# Patient Record
Sex: MALE | Employment: OTHER | URBAN - NONMETROPOLITAN AREA
[De-identification: names, ages, dates, MRNs, and addresses within clinical notes are randomized per-mention and may not be internally consistent; named-entity substitution may affect disease eponyms.]

---

## 2018-10-18 LAB
ALBUMIN SERPL-MCNC: 4 G/DL
ALP BLD-CCNC: 61 U/L
ALT SERPL-CCNC: 35 U/L
ANION GAP SERPL CALCULATED.3IONS-SCNC: 12 MMOL/L
AST SERPL-CCNC: 28 U/L
BILIRUB SERPL-MCNC: 0.6 MG/DL (ref 0.1–1.4)
BUN BLDV-MCNC: 15 MG/DL
CALCIUM SERPL-MCNC: 9.2 MG/DL
CHLORIDE BLD-SCNC: 101 MMOL/L
CHOLESTEROL, TOTAL: 149 MG/DL
CHOLESTEROL/HDL RATIO: 2.3
CO2: 33 MMOL/L
CREAT SERPL-MCNC: 0.87 MG/DL
GFR CALCULATED: NORMAL
GLUCOSE BLD-MCNC: 110 MG/DL
HDLC SERPL-MCNC: 64 MG/DL (ref 35–70)
LDL CHOLESTEROL CALCULATED: 66 MG/DL (ref 0–160)
POTASSIUM SERPL-SCNC: 4.9 MMOL/L
PROSTATE SPECIFIC ANTIGEN: 0.32 NG/ML
SODIUM BLD-SCNC: 141 MMOL/L
TESTOSTERONE TOTAL: 454
TOTAL PROTEIN: 6.7
TRIGL SERPL-MCNC: 95 MG/DL
VLDLC SERPL CALC-MCNC: 19 MG/DL

## 2018-12-03 ENCOUNTER — OFFICE VISIT (OUTPATIENT)
Dept: UROLOGY | Age: 58
End: 2018-12-03
Payer: COMMERCIAL

## 2018-12-03 VITALS
WEIGHT: 201.5 LBS | SYSTOLIC BLOOD PRESSURE: 112 MMHG | DIASTOLIC BLOOD PRESSURE: 80 MMHG | HEIGHT: 70 IN | BODY MASS INDEX: 28.85 KG/M2

## 2018-12-03 DIAGNOSIS — R68.82 LOW LIBIDO: Primary | ICD-10-CM

## 2018-12-03 LAB
BILIRUBIN URINE: NEGATIVE
BLOOD URINE, POC: NEGATIVE
CHARACTER, URINE: CLEAR
COLOR, URINE: YELLOW
GLUCOSE URINE: NEGATIVE MG/DL
KETONES, URINE: NEGATIVE
LEUKOCYTE CLUMPS, URINE: NEGATIVE
NITRITE, URINE: NEGATIVE
PH, URINE: 5.5
PROTEIN, URINE: NEGATIVE MG/DL
SPECIFIC GRAVITY, URINE: 1.02 (ref 1–1.03)
UROBILINOGEN, URINE: 0.2 EU/DL

## 2018-12-03 PROCEDURE — 99203 OFFICE O/P NEW LOW 30 MIN: CPT | Performed by: NURSE PRACTITIONER

## 2018-12-03 PROCEDURE — 81003 URINALYSIS AUTO W/O SCOPE: CPT | Performed by: NURSE PRACTITIONER

## 2018-12-03 RX ORDER — SILDENAFIL CITRATE 20 MG/1
20-100 TABLET ORAL PRN
Qty: 30 TABLET | Refills: 3 | Status: SHIPPED | OUTPATIENT
Start: 2018-12-03 | End: 2020-08-27

## 2018-12-03 RX ORDER — FINASTERIDE 5 MG/1
5 TABLET, FILM COATED ORAL DAILY
COMMUNITY
End: 2018-12-03 | Stop reason: CLARIF

## 2018-12-03 RX ORDER — ROSUVASTATIN CALCIUM 10 MG/1
10 TABLET, COATED ORAL DAILY
COMMUNITY

## 2018-12-03 RX ORDER — FINASTERIDE 1 MG/1
1 TABLET, FILM COATED ORAL DAILY
COMMUNITY

## 2020-06-03 ENCOUNTER — NURSE ONLY (OUTPATIENT)
Dept: LAB | Age: 60
End: 2020-06-03

## 2020-08-27 ENCOUNTER — HOSPITAL ENCOUNTER (INPATIENT)
Age: 60
LOS: 2 days | Discharge: HOME | DRG: 179 | End: 2020-08-30
Attending: EMERGENCY MEDICINE | Admitting: INTERNAL MEDICINE
Payer: COMMERCIAL

## 2020-08-27 ENCOUNTER — APPOINTMENT (OUTPATIENT)
Dept: GENERAL RADIOLOGY | Age: 60
DRG: 179 | End: 2020-08-27
Payer: COMMERCIAL

## 2020-08-27 LAB
ALBUMIN SERPL-MCNC: 3.6 G/DL (ref 3.5–5.1)
ALP BLD-CCNC: 53 U/L (ref 38–126)
ALT SERPL-CCNC: 17 U/L (ref 11–66)
ANION GAP SERPL CALCULATED.3IONS-SCNC: 9 MEQ/L (ref 8–16)
AST SERPL-CCNC: 20 U/L (ref 5–40)
BILIRUB SERPL-MCNC: 0.2 MG/DL (ref 0.3–1.2)
BUN BLDV-MCNC: 18 MG/DL (ref 7–22)
C-REACTIVE PROTEIN: 0.37 MG/DL (ref 0–1)
CALCIUM SERPL-MCNC: 8.4 MG/DL (ref 8.5–10.5)
CHLORIDE BLD-SCNC: 96 MEQ/L (ref 98–111)
CO2: 24 MEQ/L (ref 23–33)
CREAT SERPL-MCNC: 1 MG/DL (ref 0.4–1.2)
GFR SERPL CREATININE-BSD FRML MDRD: 76 ML/MIN/1.73M2
GLUCOSE BLD-MCNC: 118 MG/DL (ref 70–108)
LACTIC ACID: 0.8 MMOL/L (ref 0.5–2.2)
OSMOLALITY CALCULATION: 261.9 MOSMOL/KG (ref 275–300)
POTASSIUM SERPL-SCNC: 4.5 MEQ/L (ref 3.5–5.2)
PRO-BNP: 41.5 PG/ML (ref 0–900)
PROCALCITONIN: 0.05 NG/ML (ref 0.01–0.09)
SODIUM BLD-SCNC: 129 MEQ/L (ref 135–145)
TOTAL PROTEIN: 6 G/DL (ref 6.1–8)
TROPONIN T: < 0.01 NG/ML

## 2020-08-27 PROCEDURE — 83605 ASSAY OF LACTIC ACID: CPT

## 2020-08-27 PROCEDURE — 99285 EMERGENCY DEPT VISIT HI MDM: CPT

## 2020-08-27 PROCEDURE — 71045 X-RAY EXAM CHEST 1 VIEW: CPT

## 2020-08-27 PROCEDURE — U0003 INFECTIOUS AGENT DETECTION BY NUCLEIC ACID (DNA OR RNA); SEVERE ACUTE RESPIRATORY SYNDROME CORONAVIRUS 2 (SARS-COV-2) (CORONAVIRUS DISEASE [COVID-19]), AMPLIFIED PROBE TECHNIQUE, MAKING USE OF HIGH THROUGHPUT TECHNOLOGIES AS DESCRIBED BY CMS-2020-01-R: HCPCS

## 2020-08-27 PROCEDURE — 84145 PROCALCITONIN (PCT): CPT

## 2020-08-27 PROCEDURE — 87040 BLOOD CULTURE FOR BACTERIA: CPT

## 2020-08-27 PROCEDURE — 83880 ASSAY OF NATRIURETIC PEPTIDE: CPT

## 2020-08-27 PROCEDURE — 86140 C-REACTIVE PROTEIN: CPT

## 2020-08-27 PROCEDURE — 36415 COLL VENOUS BLD VENIPUNCTURE: CPT

## 2020-08-27 PROCEDURE — 6370000000 HC RX 637 (ALT 250 FOR IP): Performed by: EMERGENCY MEDICINE

## 2020-08-27 PROCEDURE — 84484 ASSAY OF TROPONIN QUANT: CPT

## 2020-08-27 PROCEDURE — 85025 COMPLETE CBC W/AUTO DIFF WBC: CPT

## 2020-08-27 PROCEDURE — 80053 COMPREHEN METABOLIC PANEL: CPT

## 2020-08-27 PROCEDURE — 82728 ASSAY OF FERRITIN: CPT

## 2020-08-27 RX ORDER — ACETAMINOPHEN 325 MG/1
650 TABLET ORAL ONCE
Status: COMPLETED | OUTPATIENT
Start: 2020-08-27 | End: 2020-08-27

## 2020-08-27 RX ADMIN — ACETAMINOPHEN 650 MG: 325 TABLET ORAL at 22:13

## 2020-08-27 ASSESSMENT — PAIN SCALES - GENERAL: PAINLEVEL_OUTOF10: 0

## 2020-08-28 PROBLEM — U07.1 COVID-19: Status: ACTIVE | Noted: 2020-08-28

## 2020-08-28 LAB
ATYPICAL LYMPHOCYTES: ABNORMAL %
BASOPHILS # BLD: 0.3 %
BASOPHILS ABSOLUTE: 0 THOU/MM3 (ref 0–0.1)
EOSINOPHIL # BLD: 0.8 %
EOSINOPHILS ABSOLUTE: 0 THOU/MM3 (ref 0–0.4)
ERYTHROCYTE [DISTWIDTH] IN BLOOD BY AUTOMATED COUNT: 12.4 % (ref 11.5–14.5)
ERYTHROCYTE [DISTWIDTH] IN BLOOD BY AUTOMATED COUNT: 41.5 FL (ref 35–45)
FERRITIN: 115 NG/ML (ref 22–322)
HCT VFR BLD CALC: 42.7 % (ref 42–52)
HEMOGLOBIN: 13.6 GM/DL (ref 14–18)
IMMATURE GRANS (ABS): 0.01 THOU/MM3 (ref 0–0.07)
IMMATURE GRANULOCYTES: 0.3 %
LYMPHOCYTES # BLD: 25.2 %
LYMPHOCYTES ABSOLUTE: 1 THOU/MM3 (ref 1–4.8)
MCH RBC QN AUTO: 29.2 PG (ref 26–33)
MCHC RBC AUTO-ENTMCNC: 31.9 GM/DL (ref 32.2–35.5)
MCV RBC AUTO: 91.8 FL (ref 80–94)
MONOCYTES # BLD: 15.4 %
MONOCYTES ABSOLUTE: 0.6 THOU/MM3 (ref 0.4–1.3)
NUCLEATED RED BLOOD CELLS: 0 /100 WBC
PLATELET # BLD: 185 THOU/MM3 (ref 130–400)
PLATELET ESTIMATE: ADEQUATE
PMV BLD AUTO: 9.2 FL (ref 9.4–12.4)
RBC # BLD: 4.65 MILL/MM3 (ref 4.7–6.1)
SARS-COV-2, NAAT: DETECTED
SCAN OF BLOOD SMEAR: NORMAL
SEG NEUTROPHILS: 58 %
SEGMENTED NEUTROPHILS ABSOLUTE COUNT: 2.2 THOU/MM3 (ref 1.8–7.7)
WBC # BLD: 3.8 THOU/MM3 (ref 4.8–10.8)

## 2020-08-28 PROCEDURE — 2580000003 HC RX 258: Performed by: PHYSICIAN ASSISTANT

## 2020-08-28 PROCEDURE — 6370000000 HC RX 637 (ALT 250 FOR IP): Performed by: PHYSICIAN ASSISTANT

## 2020-08-28 PROCEDURE — 2500000003 HC RX 250 WO HCPCS: Performed by: INTERNAL MEDICINE

## 2020-08-28 PROCEDURE — 6360000002 HC RX W HCPCS: Performed by: PHYSICIAN ASSISTANT

## 2020-08-28 PROCEDURE — 99223 1ST HOSP IP/OBS HIGH 75: CPT | Performed by: PHYSICIAN ASSISTANT

## 2020-08-28 PROCEDURE — U0002 COVID-19 LAB TEST NON-CDC: HCPCS

## 2020-08-28 PROCEDURE — 99221 1ST HOSP IP/OBS SF/LOW 40: CPT | Performed by: INTERNAL MEDICINE

## 2020-08-28 PROCEDURE — 2580000003 HC RX 258: Performed by: INTERNAL MEDICINE

## 2020-08-28 PROCEDURE — 94760 N-INVAS EAR/PLS OXIMETRY 1: CPT

## 2020-08-28 PROCEDURE — 1200000003 HC TELEMETRY R&B

## 2020-08-28 RX ORDER — PROMETHAZINE HYDROCHLORIDE 25 MG/1
12.5 TABLET ORAL EVERY 6 HOURS PRN
Status: DISCONTINUED | OUTPATIENT
Start: 2020-08-28 | End: 2020-08-30 | Stop reason: HOSPADM

## 2020-08-28 RX ORDER — SODIUM CHLORIDE 0.9 % (FLUSH) 0.9 %
10 SYRINGE (ML) INJECTION EVERY 12 HOURS SCHEDULED
Status: DISCONTINUED | OUTPATIENT
Start: 2020-08-28 | End: 2020-08-30 | Stop reason: HOSPADM

## 2020-08-28 RX ORDER — SODIUM CHLORIDE 0.9 % (FLUSH) 0.9 %
10 SYRINGE (ML) INJECTION PRN
Status: DISCONTINUED | OUTPATIENT
Start: 2020-08-28 | End: 2020-08-30 | Stop reason: HOSPADM

## 2020-08-28 RX ORDER — POLYETHYLENE GLYCOL 3350 17 G/17G
17 POWDER, FOR SOLUTION ORAL DAILY PRN
Status: DISCONTINUED | OUTPATIENT
Start: 2020-08-28 | End: 2020-08-30 | Stop reason: HOSPADM

## 2020-08-28 RX ORDER — ONDANSETRON 2 MG/ML
4 INJECTION INTRAMUSCULAR; INTRAVENOUS EVERY 6 HOURS PRN
Status: DISCONTINUED | OUTPATIENT
Start: 2020-08-28 | End: 2020-08-30 | Stop reason: HOSPADM

## 2020-08-28 RX ORDER — ACETAMINOPHEN 325 MG/1
650 TABLET ORAL EVERY 6 HOURS PRN
Status: DISCONTINUED | OUTPATIENT
Start: 2020-08-28 | End: 2020-08-30 | Stop reason: HOSPADM

## 2020-08-28 RX ORDER — ACETAMINOPHEN 650 MG/1
650 SUPPOSITORY RECTAL EVERY 6 HOURS PRN
Status: DISCONTINUED | OUTPATIENT
Start: 2020-08-28 | End: 2020-08-30 | Stop reason: HOSPADM

## 2020-08-28 RX ORDER — FINASTERIDE 1 MG/1
1 TABLET, FILM COATED ORAL DAILY
Status: DISCONTINUED | OUTPATIENT
Start: 2020-08-28 | End: 2020-08-30 | Stop reason: HOSPADM

## 2020-08-28 RX ORDER — ROSUVASTATIN CALCIUM 10 MG/1
10 TABLET, COATED ORAL DAILY
Status: DISCONTINUED | OUTPATIENT
Start: 2020-08-28 | End: 2020-08-30 | Stop reason: HOSPADM

## 2020-08-28 RX ORDER — 0.9 % SODIUM CHLORIDE 0.9 %
30 INTRAVENOUS SOLUTION INTRAVENOUS DAILY
Status: DISCONTINUED | OUTPATIENT
Start: 2020-08-28 | End: 2020-08-30 | Stop reason: HOSPADM

## 2020-08-28 RX ADMIN — ACETAMINOPHEN 650 MG: 325 TABLET ORAL at 05:42

## 2020-08-28 RX ADMIN — ROSUVASTATIN CALCIUM 10 MG: 10 TABLET, FILM COATED ORAL at 19:46

## 2020-08-28 RX ADMIN — ENOXAPARIN SODIUM 30 MG: 30 INJECTION SUBCUTANEOUS at 05:42

## 2020-08-28 RX ADMIN — SODIUM CHLORIDE 30 ML: 9 INJECTION, SOLUTION INTRAVENOUS at 13:05

## 2020-08-28 RX ADMIN — ENOXAPARIN SODIUM 30 MG: 30 INJECTION SUBCUTANEOUS at 18:49

## 2020-08-28 RX ADMIN — SODIUM CHLORIDE, PRESERVATIVE FREE 10 ML: 5 INJECTION INTRAVENOUS at 13:06

## 2020-08-28 RX ADMIN — REMDESIVIR 200 MG: 100 INJECTION, POWDER, LYOPHILIZED, FOR SOLUTION INTRAVENOUS at 13:05

## 2020-08-28 RX ADMIN — ACETAMINOPHEN 650 MG: 325 TABLET ORAL at 16:06

## 2020-08-28 RX ADMIN — ACETAMINOPHEN 650 MG: 325 TABLET ORAL at 23:39

## 2020-08-28 ASSESSMENT — PAIN SCALES - GENERAL
PAINLEVEL_OUTOF10: 0

## 2020-08-28 ASSESSMENT — ENCOUNTER SYMPTOMS
NAUSEA: 0
DIARRHEA: 0
EYE PAIN: 0
PHOTOPHOBIA: 0
EYE ITCHING: 0
SHORTNESS OF BREATH: 0
ABDOMINAL PAIN: 0
STRIDOR: 0
COUGH: 1
VOMITING: 0
GASTROINTESTINAL NEGATIVE: 1
BACK PAIN: 0
CONSTIPATION: 0
CHEST TIGHTNESS: 0
EYES NEGATIVE: 1
SHORTNESS OF BREATH: 1
ABDOMINAL DISTENTION: 0
SORE THROAT: 0
RHINORRHEA: 0
EYE REDNESS: 0
EYE DISCHARGE: 0
WHEEZING: 0
ALLERGIC/IMMUNOLOGIC NEGATIVE: 1

## 2020-08-28 NOTE — H&P
Assessment and Plan:        1. COVID 19: decadron, med nebs as needed, O2 therapy as needed, now on room air    CC:  fever  HPI: Patient presents to the ED with fever for 6 days. The patient has had a COVID 19 exposure (wife) and presumed he was positive. The patient has been feeling progressively worse. The patient felt like he was having a hard time catching his breath after any activity. In the ED the patient tolerated room air but his saturations were less than 90% with ambulation. ROS: Review of Systems   Constitutional: Positive for fatigue and fever. HENT: Positive for congestion. Eyes: Negative. Respiratory: Positive for cough and shortness of breath. Cardiovascular: Negative. Gastrointestinal: Negative. Endocrine: Negative. Genitourinary: Negative. Musculoskeletal: Negative. Skin: Negative. Allergic/Immunologic: Negative. Neurological: Positive for weakness. Hematological: Negative. Psychiatric/Behavioral: Negative.           PMH:    Past Medical History:   Diagnosis Date    Hyperlipidemia        SHX:    Social History     Socioeconomic History    Marital status: Unknown     Spouse name: Not on file    Number of children: Not on file    Years of education: Not on file    Highest education level: Not on file   Occupational History    Not on file   Social Needs    Financial resource strain: Not on file    Food insecurity     Worry: Not on file     Inability: Not on file    Transportation needs     Medical: Not on file     Non-medical: Not on file   Tobacco Use    Smoking status: Former Smoker     Packs/day: 1.00     Years: 12.00     Pack years: 12.00     Types: Cigarettes    Smokeless tobacco: Never Used   Substance and Sexual Activity    Alcohol use: Not on file    Drug use: Not on file    Sexual activity: Not on file   Lifestyle    Physical activity     Days per week: Not on file     Minutes per session: Not on file    Stress: Not on file Relationships    Social connections     Talks on phone: Not on file     Gets together: Not on file     Attends Latter-day service: Not on file     Active member of club or organization: Not on file     Attends meetings of clubs or organizations: Not on file     Relationship status: Not on file    Intimate partner violence     Fear of current or ex partner: Not on file     Emotionally abused: Not on file     Physically abused: Not on file     Forced sexual activity: Not on file   Other Topics Concern    Not on file   Social History Narrative    Not on file       FHX: History reviewed. No pertinent family history. Allergies: No Known Allergies    Medications:    No current facility-administered medications on file prior to encounter.       Current Outpatient Medications on File Prior to Encounter   Medication Sig Dispense Refill    rosuvastatin (CRESTOR) 10 MG tablet Take 10 mg by mouth daily      finasteride (PROPECIA) 1 MG tablet Take 1 mg by mouth daily               Labs:   Recent Results (from the past 24 hour(s))   CBC Auto Differential    Collection Time: 08/27/20 10:50 PM   Result Value Ref Range    WBC 3.8 (L) 4.8 - 10.8 thou/mm3    RBC 4.65 (L) 4.70 - 6.10 mill/mm3    Hemoglobin 13.6 (L) 14.0 - 18.0 gm/dl    Hematocrit 42.7 42.0 - 52.0 %    MCV 91.8 80.0 - 94.0 fL    MCH 29.2 26.0 - 33.0 pg    MCHC 31.9 (L) 32.2 - 35.5 gm/dl    RDW-CV 12.4 11.5 - 14.5 %    RDW-SD 41.5 35.0 - 45.0 fL    Platelets 313 276 - 317 thou/mm3    MPV 9.2 (L) 9.4 - 12.4 fL    Seg Neutrophils 58.0 %    Lymphocytes 25.2 %    Monocytes 15.4 %    Eosinophils 0.8 %    Basophils 0.3 %    Immature Granulocytes 0.3 %    Atypical Lymphocytes OCC. %    Platelet Estimate ADEQUATE Adequate    Segs Absolute 2.2 1.8 - 7.7 thou/mm3    Lymphocytes Absolute 1.0 1.0 - 4.8 thou/mm3    Monocytes Absolute 0.6 0.4 - 1.3 thou/mm3    Eosinophils Absolute 0.0 0.0 - 0.4 thou/mm3    Basophils Absolute 0.0 0.0 - 0.1 thou/mm3    Immature Grans (Abs) 0.01 FiO2: RA: O2 Sat:.93%: I/O: No intake or output data in the 24 hours ending 08/28/20 0641      General:   Well appearing, no acute distress  HEENT:  normocephalic and atraumatic. No scleral icterus. PEARLA, mucous membranes moist  Neck: supple. Trachea midline. No JVD. Full ROM, no meningismus. Lungs: clear to auscultation. No retractions, no accessory muscle use. Cardiac: RRR, no murmur, 2+ pulses  Abdomen: soft. Nontender. Bowel sounds active  Extremities:  No clubbing, cyanosis x 4, no edema    Vasculature: capillary refill < 3 seconds. Skin:  warm and dry. no visible rashes  Psych:  Alert and oriented x3. Affect appropriate  Lymph:  No supraclavicular adenopathy. Neurologic:  CN II-XII grossly intact. No focal deficit. Electronically signed by  Nicole Starr PA-C                            Patient seen by me. Patient with intermittent fevers. Patient is COVID positive. Chest x-ray shows no infiltrative process but the patient does desaturate with activity. As the patient has exertional hypoxemia, patient was admitted and started on supplemental oxygen. Initiate Remdesivir. Electronically signed by Cher Contreras MD.

## 2020-08-28 NOTE — ED NOTES
Pt updated on POC. Pt respirations easy and unlabored. Pt denies any further needs at this time.       Lance Israel RN  08/28/20 2938

## 2020-08-28 NOTE — ED NOTES
Patient's pulse oximetry while resting is 94%. While ambulating, patient's pulse ox dropped to 88-89%.  Provider notified      Casi Chiu RN  08/27/20 9448

## 2020-08-28 NOTE — ED NOTES
ED to inpatient nurses report    Chief Complaint   Patient presents with    Fever      Present to ED from home   LOC: alert and orientated to name, place, date  Vital signs   Vitals:    08/27/20 2314 08/28/20 0025 08/28/20 0110 08/28/20 0201   BP: 116/77 115/66 118/75 98/67   Pulse: 86 72 74 73   Resp: 18 16 20 20   Temp:   98.7 °F (37.1 °C)    TempSrc:   Oral    SpO2: 93% 94% 95% 98%   Weight:       Height:          Oxygen Baseline 93% on room air     Current needs required N/A  LDAs:   Peripheral IV 08/27/20 Right Antecubital (Active)   Site Assessment Clean;Dry; Intact 08/28/20 0202   Line Status Normal saline locked 08/28/20 0202   Dressing Status Clean;Dry; Intact 08/28/20 0202   Dressing Intervention New 08/27/20 2311     Mobility: Independent  Pending ED orders: N/A  Present condition: VSS    Electronically signed by Caryn Davis RN on 8/28/2020 at 2:34 AM     Caryn Davis RN  08/28/20 0806

## 2020-08-28 NOTE — ED NOTES
This RN received report from [facility]. Pt up to use urinal in room. Pt respirations easy and unlabored. Pt denies any further needs at this time.       Vinny Mariscal RN  08/28/20 0111

## 2020-08-28 NOTE — PROGRESS NOTES
Walked with patient up and down hallway with pulse ox - partially due to wife's insisting that he was falling below 88% last evening with activity. Started at 93% on room air. Proceeded to ambulate down li way from his room to lobby doors six times, approximately 600 feet. Oxygen saturation did not fall below 91% on room air. Patient maintained mostly 92% with ambulation.

## 2020-08-28 NOTE — ED NOTES
Patient medicated per MAR at this time. Patient denies further needs.  Patient resting on cot, respirations easy and unlabored      Gloria Figueroa RN  08/27/20 9226

## 2020-08-28 NOTE — ED TRIAGE NOTES
Patient presents to ED with complaints of a fever and a cough. Patient states this is the 6th day he has had a fever. Patient denies recent illness but has been around people who have been ill. Patient resting on cot, respirations easy and unlabored.

## 2020-08-28 NOTE — CARE COORDINATION
20, 10:40 AM EDT  DISCHARGE PLANNING EVALUATION:    Carlos Enrique Mishra       Admitted from: ED 2136 Hospital day: 0   Location: [address] [place] [address] Reason for admit: COVID-19 [U07.1] Status: IP  Admit order signed?: yes  PMH:  has a past medical history of Hyperlipidemia. Procedure: none  Pertinent abnormal Imagin/27 CXR: No gross infiltrate. PA and lateral radiographs could be performed if indicated clinically  Medications:  Scheduled Meds:   finasteride  1 mg Oral Daily    rosuvastatin  10 mg Oral Daily    sodium chloride flush  10 mL Intravenous 2 times per day    enoxaparin  30 mg Subcutaneous Q12H     Continuous Infusions:   Pertinent Info/Orders/Treatment Plan: Presented with c/o fever x 6 days. His wife was known to be +COVID 23 and he presumed he was. He progressively began feeling worse, with c/o SOB with activity. In ED, sats less then 90% with ambulation. On room air with sats 93% at rest this morning. Tmax 101.4. NSR. Ox4. +COVID 19. Telemetry. Lovenox bid, propecia, crestor. Na+ 129, Trop neg, wbc 3.8, hgb 13.6. Diet: DIET GENERAL;   Smoking status:  reports that he has quit smoking. His smoking use included cigarettes. He has a 12.00 pack-year smoking history.  He has never used smokeless tobacco.   PCP: Philipp Caro DO  Readmission 30 days or less: no  Readmission Risk Score: 8%    Discharge Planning Evaluation  Current Residence:     Living Arrangements:  Spouse/Significant Other   Support Systems:  Spouse/Significant Other, Friends/Neighbors, Family Members  Current Services PTA:     Potential Assistance Needed:  N/A  Potential Assistance Purchasing Medications:  No  Does patient want to participate in local refill/ meds to beds program?  No  Type of Home Care Services:  None  Patient expects to be discharged to:  Home with wife  Expected Discharge date:  20  Follow Up Appointment: Best Day/ Time: Wednesday AM    Patient Goals/Plan/Treatment Preferences: Spoke with Aime Amezquita; states he lives at home with his wife and did not use any DME or have any HH services PTA. He is retired. He drives, cares for himself independently, and has a PCP. Garrett Gomes states he plans to return home with his wife at discharge, denies needs, and declines HH. Continue to monitor for possible home O2 needs at discharge. Transportation/Food Security/Housekeeping Addressed:  No issues identified.     Evaluation: no

## 2020-08-28 NOTE — ED PROVIDER NOTES
[address] ENCOUNTER      PATIENT NAME: Amador Henriquez  MRN: [id number]  : 1960  JONES: 2020  PROVIDER: Mina Mcnamara MD      CHIEF COMPLAINT       Chief Complaint   Patient presents with    Fever       Nurses Notes reviewed and I agreeexcept as noted in the HPI. HISTORY OF PRESENT ILLNESS    Amador Henriquez is a 63 y.o. male who presents to Emergency Department with Fever    Onset: Gradual  Location: At home  Severity: Moderate  Timing: Last six days  Modifying factors: None  Quality: Fever for 6 days. Temperature 100.5-102 at home. No smell or taste loss. Radiation: None  Duration: Persistent  Context: History of recent positive COVID exposure. Prior episodes: None  Therapy today: Over-the-counter Tylenol  Associated Symptoms: Fatigue, loss of appetite  Additional history: Healthy otherwise. This HPI was provided by the patient. REVIEW OF SYSTEMS     Review of Systems   Constitutional: Positive for activity change, appetite change, fatigue and fever. Negative for chills and unexpected weight change. HENT: Positive for congestion. Negative for ear discharge, ear pain, hearing loss, nosebleeds, rhinorrhea and sore throat. Eyes: Negative for photophobia, pain, discharge, redness and itching. Respiratory: Positive for cough. Negative for chest tightness, shortness of breath, wheezing and stridor. Cardiovascular: Negative for chest pain, palpitations and leg swelling. Gastrointestinal: Negative for abdominal distention, abdominal pain, constipation, diarrhea, nausea and vomiting. Endocrine: Negative for cold intolerance, heat intolerance, polydipsia and polyphagia. Genitourinary: Negative for dysuria, flank pain, frequency and hematuria. Musculoskeletal: Negative for arthralgias, back pain, gait problem, myalgias, neck pain and neck stiffness. Skin: Negative for pallor, rash and wound.    Allergic/Immunologic: Negative for environmental allergies and food allergies. Neurological: Positive for weakness and headaches. Negative for dizziness, tremors and syncope. Psychiatric/Behavioral: Negative for agitation, behavioral problems, confusion, self-injury, sleep disturbance and suicidal ideas. PAST MEDICAL HISTORY    has a past medical history of Hyperlipidemia. SURGICAL HISTORY      has a past surgical history that includes knee surgery (Right); knee surgery (Left); Inguinal hernia repair (Left); and Tonsillectomy. CURRENT MEDICATIONS       Previous Medications    FINASTERIDE (PROPECIA) 1 MG TABLET    Take 1 mg by mouth daily    ROSUVASTATIN (CRESTOR) 10 MG TABLET    Take 10 mg by mouth daily       ALLERGIES     has No Known Allergies. FAMILY HISTORY     has no family status information on file. family history is not on file. SOCIAL HISTORY      reports that he has quit smoking. His smoking use included cigarettes. He has a 12.00 pack-year smoking history. He has never used smokeless tobacco.    PHYSICAL EXAM     INITIAL VITALS:  height is 5' 9\" (1.753 m) and weight is 195 lb (88.5 kg). His oral temperature is 98.7 °F (37.1 °C). His blood pressure is 98/67 and his pulse is 73. His respiration is 20 and oxygen saturation is 98%. Physical Exam  Vitals signs and nursing note reviewed. Constitutional:       Appearance: He is well-developed. He is not diaphoretic. HENT:      Head: Normocephalic and atraumatic. Nose: Nose normal.   Eyes:      General: No scleral icterus. Right eye: No discharge. Left eye: No discharge. Conjunctiva/sclera: Conjunctivae normal.      Pupils: Pupils are equal, round, and reactive to light. Neck:      Musculoskeletal: Normal range of motion and neck supple. Vascular: No JVD. Trachea: No tracheal deviation. Cardiovascular:      Rate and Rhythm: Normal rate and regular rhythm. Heart sounds: Normal heart sounds. No murmur. No friction rub. No gallop. Pulmonary:      Effort: Pulmonary effort is normal. No respiratory distress. Breath sounds: Normal breath sounds. No stridor. No wheezing or rales. Chest:      Chest wall: No tenderness. Abdominal:      General: Bowel sounds are normal. There is no distension. Palpations: Abdomen is soft. There is no mass. Tenderness: There is no abdominal tenderness. There is no guarding or rebound. Hernia: No hernia is present. Musculoskeletal:         General: No tenderness or deformity. Lymphadenopathy:      Cervical: No cervical adenopathy. Skin:     General: Skin is warm and dry. Capillary Refill: Capillary refill takes less than 2 seconds. Coloration: Skin is not pale. Findings: No erythema or rash. Neurological:      Mental Status: He is alert and oriented to person, place, and time. Cranial Nerves: No cranial nerve deficit. Sensory: No sensory deficit. Motor: No abnormal muscle tone. Coordination: Coordination normal.      Deep Tendon Reflexes: Reflexes normal.   Psychiatric:         Behavior: Behavior normal.         Thought Content: Thought content normal.         Judgment: Judgment normal.       DIFFERENTIAL DIAGNOSIS:   Covid-19, viral illness, UTI, pneumonia    DIAGNOSTIC RESULTS     EKG: All EKG's are interpreted by the Emergency Department Physician who either signs or Co-signsthis chart in the absence of a cardiologist.  Interpreted by me  None    RADIOLOGY: non-plain film images(s) such as CT, Ultrasound and MRI are read by the radiologist.    XR CHEST PORTABLE   Final Result   No gross infiltrate. PA and lateral radiographs could be performed if indicated clinically. **This report has been created using voice recognition software. It may contain minor errors which are inherent in voice recognition technology. **      Final report electronically signed by Dr. Ernestine Ghotra on 8/27/2020 10:21 PM          []Visualized and Pro-BNP 41.5 0.0 - 900.0 pg/mL   Procalcitonin   Result Value Ref Range    Procalcitonin 0.05 0.01 - 0.09 ng/mL   Anion Gap   Result Value Ref Range    Anion Gap 9.0 8.0 - 16.0 meq/L   Glomerular Filtration Rate, Estimated   Result Value Ref Range    Est, Glom Filt Rate 76 (A) ml/min/1.73m2   Osmolality   Result Value Ref Range    Osmolality Calc 261.9 (L) 275.0 - 300.0 mOsmol/kg   COVID-19   Result Value Ref Range    SARS-CoV-2, NAAT DETECTED (AA) NOT DETECTED   Scan of Blood Smear   Result Value Ref Range    SCAN OF BLOOD SMEAR see below        EMERGENCY DEPARTMENT COURSE:   Vitals:    Vitals:    08/27/20 2314 08/28/20 0025 08/28/20 0110 08/28/20 0201   BP: 116/77 115/66 118/75 98/67   Pulse: 86 72 74 73   Resp: 18 16 20 20   Temp:   98.7 °F (37.1 °C)    TempSrc:   Oral    SpO2: 93% 94% 95% 98%   Weight:       Height:           10:16 PM: Patient is seen and evaluated in a timely fashion. ACTIONS:    Large bore IV  Tele monitor  CXR  Labs  Medications: PO Tylenol    MEDICAL DECISION MAKINGS:    Laboratory results showed leukopenia and positive COVID-19 test.  I reviewed his chest x-ray, and I could see subtle infiltrate to right middle lobe although radiology report as no acute findings. Patient's pulse ox during ambulation dropped to 88%, admission for observation was warranted. Discussed and admitted to hospital service. CRITICAL CARE:   None    CONSULTS:  None    PROCEDURES:  None    FINAL IMPRESSION      1. COVID-19 virus infection    2.  Hypoxia          DISPOSITION/PLAN   Admit    PATIENT REFERRED TO:  DO Vitor Merrill [address] [facility], [address]  [phone]            DISCHARGE MEDICATIONS:  New Prescriptions    No medications on file       (Please note that portions of this note were completed with a voice recognition program.  Efforts were made to edit the dictations but occasionally words aremis-transcribed.)    MD Lisbeth Ho MD  08/28/20 9446

## 2020-08-28 NOTE — ED NOTES
ED nurse-to-nurse bedside report     Chief Complaint   Patient presents with    Fever      LOC: alert and orientated to name, place, date  Vital signs   Vitals:    08/27/20 2142 08/27/20 2216 08/27/20 2314 08/28/20 0025   BP: (!) 146/89 132/84 116/77 115/66   Pulse: 83 86 86 72   Resp: 18 18 18 16   Temp: 101.1 °F (38.4 °C)      TempSrc: Oral      SpO2: 93% 94% 93% 94%   Weight: 195 lb (88.5 kg)      Height: 5' 9\" (1.753 m)         Pain:    Pain Interventions: nonpharmacological   Pain Goal: 0  Oxygen: No    Current needs required room air   Telemetry: No  LDAs:   Peripheral IV 08/27/20 Right Antecubital (Active)   Site Assessment Clean;Dry; Intact 08/27/20 2311   Line Status Blood return noted;Specimen collected; Flushed;Normal saline locked 08/27/20 2311   Dressing Status Clean;Dry; Intact 08/27/20 2311   Dressing Intervention New 08/27/20 2311     Continuous Infusions:   Mobility: Independent  Fairchild Fall Risk Score: No flowsheet data found.   Fall Interventions: side rails up x2, call light in reach   Report given to: Huber Palacios RN  08/28/20 0100

## 2020-08-29 LAB
ALBUMIN SERPL-MCNC: 3.6 G/DL (ref 3.5–5.1)
ALP BLD-CCNC: 52 U/L (ref 38–126)
ALT SERPL-CCNC: 14 U/L (ref 11–66)
AST SERPL-CCNC: 19 U/L (ref 5–40)
BILIRUB SERPL-MCNC: 0.3 MG/DL (ref 0.3–1.2)
BILIRUBIN DIRECT: < 0.2 MG/DL (ref 0–0.3)
SARS-COV-2: DETECTED
TOTAL PROTEIN: 5.9 G/DL (ref 6.1–8)

## 2020-08-29 PROCEDURE — 2580000003 HC RX 258: Performed by: INTERNAL MEDICINE

## 2020-08-29 PROCEDURE — 2580000003 HC RX 258: Performed by: PHYSICIAN ASSISTANT

## 2020-08-29 PROCEDURE — 2580000003 HC RX 258: Performed by: NURSE PRACTITIONER

## 2020-08-29 PROCEDURE — 1200000000 HC SEMI PRIVATE

## 2020-08-29 PROCEDURE — 6360000002 HC RX W HCPCS: Performed by: NURSE PRACTITIONER

## 2020-08-29 PROCEDURE — 1200000003 HC TELEMETRY R&B

## 2020-08-29 PROCEDURE — 99232 SBSQ HOSP IP/OBS MODERATE 35: CPT | Performed by: NURSE PRACTITIONER

## 2020-08-29 PROCEDURE — 80076 HEPATIC FUNCTION PANEL: CPT

## 2020-08-29 PROCEDURE — 2500000003 HC RX 250 WO HCPCS: Performed by: INTERNAL MEDICINE

## 2020-08-29 PROCEDURE — 6370000000 HC RX 637 (ALT 250 FOR IP): Performed by: NURSE PRACTITIONER

## 2020-08-29 PROCEDURE — 6360000002 HC RX W HCPCS: Performed by: PHYSICIAN ASSISTANT

## 2020-08-29 PROCEDURE — 36415 COLL VENOUS BLD VENIPUNCTURE: CPT

## 2020-08-29 PROCEDURE — 6370000000 HC RX 637 (ALT 250 FOR IP): Performed by: PHYSICIAN ASSISTANT

## 2020-08-29 RX ORDER — ASCORBIC ACID 500 MG
1000 TABLET ORAL DAILY
Status: DISCONTINUED | OUTPATIENT
Start: 2020-08-29 | End: 2020-08-30 | Stop reason: HOSPADM

## 2020-08-29 RX ORDER — VITAMIN B COMPLEX
1000 TABLET ORAL DAILY
Status: DISCONTINUED | OUTPATIENT
Start: 2020-08-29 | End: 2020-08-30 | Stop reason: HOSPADM

## 2020-08-29 RX ORDER — DEXAMETHASONE 4 MG/1
6 TABLET ORAL DAILY
Status: DISCONTINUED | OUTPATIENT
Start: 2020-08-29 | End: 2020-08-30 | Stop reason: HOSPADM

## 2020-08-29 RX ORDER — ZINC SULFATE 50(220)MG
50 CAPSULE ORAL DAILY
Status: DISCONTINUED | OUTPATIENT
Start: 2020-08-29 | End: 2020-08-30 | Stop reason: HOSPADM

## 2020-08-29 RX ADMIN — DEXAMETHASONE 6 MG: 4 TABLET ORAL at 17:16

## 2020-08-29 RX ADMIN — SODIUM CHLORIDE, PRESERVATIVE FREE 10 ML: 5 INJECTION INTRAVENOUS at 08:57

## 2020-08-29 RX ADMIN — REMDESIVIR 100 MG: 100 INJECTION, POWDER, LYOPHILIZED, FOR SOLUTION INTRAVENOUS at 11:56

## 2020-08-29 RX ADMIN — SODIUM CHLORIDE, PRESERVATIVE FREE 10 ML: 5 INJECTION INTRAVENOUS at 19:47

## 2020-08-29 RX ADMIN — ZINC SULFATE 220 MG (50 MG) CAPSULE 50 MG: CAPSULE at 18:16

## 2020-08-29 RX ADMIN — SODIUM CHLORIDE 30 ML: 9 INJECTION, SOLUTION INTRAVENOUS at 11:56

## 2020-08-29 RX ADMIN — ROSUVASTATIN CALCIUM 10 MG: 10 TABLET, FILM COATED ORAL at 19:39

## 2020-08-29 RX ADMIN — ENOXAPARIN SODIUM 30 MG: 30 INJECTION SUBCUTANEOUS at 19:46

## 2020-08-29 RX ADMIN — VITAMIN D 1000 UNITS: 25 TAB ORAL at 17:16

## 2020-08-29 RX ADMIN — ENOXAPARIN SODIUM 30 MG: 30 INJECTION SUBCUTANEOUS at 08:56

## 2020-08-29 RX ADMIN — ACETAMINOPHEN 650 MG: 325 TABLET ORAL at 19:46

## 2020-08-29 RX ADMIN — OXYCODONE HYDROCHLORIDE AND ACETAMINOPHEN 1000 MG: 500 TABLET ORAL at 17:16

## 2020-08-29 RX ADMIN — AZITHROMYCIN 500 MG: 500 INJECTION, POWDER, LYOPHILIZED, FOR SOLUTION INTRAVENOUS at 17:16

## 2020-08-29 ASSESSMENT — PAIN SCALES - GENERAL
PAINLEVEL_OUTOF10: 2
PAINLEVEL_OUTOF10: 0
PAINLEVEL_OUTOF10: 0

## 2020-08-29 NOTE — PLAN OF CARE
Problem: Gas Exchange - Impaired  Goal: Absence of hypoxia  Outcome: Ongoing  Note: Pt. On room air. Pt O2 level been above 90% throughout shift. Coughing and deep breathing been encouraged. Lungs sound assessed. Cap refill less than 3 sec. Problem: Breathing Pattern - Ineffective  Goal: Ability to achieve and maintain a regular respiratory rate  Outcome: Ongoing  Note: Pt has very mild dyspnea when walking, breathing is unlabored. Pt did not complain of any SOB. Problem: Body Temperature -  Risk of, Imbalanced  Goal: Ability to maintain a body temperature within defined limits  Outcome: Ongoing  Note: Pt temp was monitored closely. Problem: Isolation Precautions - Risk of Spread of Infection  Goal: Prevent transmission of infection  Outcome: Ongoing  Note: Pt remains in droplet plus isolation. Problem: Nutrition Deficits  Goal: Optimize nutrtional status  Outcome: Ongoing  Note: Pt on general diet and tolerating it well. Problem: Risk for Fluid Volume Deficit  Goal: Maintain normal heart rhythm  Outcome: Ongoing  Note: Pt is drinking adequate amount of liquids. No sign or symptom of dehydration or fluid overload noted. Problem: Loneliness or Risk for Loneliness  Goal: Demonstrate positive use of time alone when socialization is not possible  Outcome: Ongoing  Note: Sharing feelings and thoughts encouraged. Support provided. Therapeutic communication maintained. Will continue to provide support. Problem: Discharge Planning:  Goal: Discharged to appropriate level of care  Description: Discharged to appropriate level of care  Outcome: Ongoing  Note: No discharge date at this time.  on case. Pt. Planning to go home with wife after discharge. Will monitor for after discharge needs.          Problem: Falls - Risk of:  Goal: Will remain free from falls  Description: Will remain free from falls  Outcome: Ongoing  Note: Bed alarm on. call light in reach, bed lowest position and wheels locked. Educated on use of call light before ambulation and when in need of assistance. Patient expressed understanding. Nonskid socks on. Hourly visual checks performed and charted. Toileting offered to patient. Arm band & falling star in place. Will continue to monitor. No falls during shift. Problem: Skin Integrity:  Goal: Will show no infection signs and symptoms  Description: Will show no infection signs and symptoms  Outcome: Ongoing  Note: No new skin breakdown or tears noted. Mucus membranes pink and moist and intact. Will continue to assess and monitor skin. Pt turns self and changes position frequently. Problem: Pain:  Goal: Pain level will decrease  Description: Pain level will decrease  Outcome: Ongoing  Note: Pain assessed every hour and PRN. Denied pain this shift. Will continue to monitor pain level. Care plan reviewed with patient. Patient verbalize understanding of the plan of care and contribute to goal setting.

## 2020-08-29 NOTE — PROGRESS NOTES
Hospitalist Progress Note      Patient:  [facility]    Unit/Bed:6A-17/017-A  YOB: 1960  MRN: [id number]   Acct: [de-identified]   PCP: Kavitha Redding DO  Date of Admission: 8/27/2020    Assessment/Plan:    1. COVID-19: Identified on lab testing. Start Remdesivir, azithromycin, Decadron, ascorbic acid, vitamin D, and zinc.  Start incentive spirometry, Acapella, and pulmonary toileting. 2. SIRS: Criteria met with fever and leukopenia. Continue treatment as described above. 3. Hyperlipidemia: Continue rosuvastatin. Chief Complaint: Fever    Initial H and P:-    **From Chart Review:  \"Patient presents to the ED with fever for 6 days. The patient has had a COVID 19 exposure (wife) and presumed he was positive. The patient has been feeling progressively worse. The patient felt like he was having a hard time catching his breath after any activity. In the ED the patient tolerated room air but his saturations were less than 90% with ambulation. \"    Subjective (past 24 hours):   Patient resting in bed at the time of the interview. Currently denies any physical complaints and was updated on current plan of care, verbalizes understanding, and had no other needs or questions at this time. Past medical history, family history, social history and allergies reviewed again and is unchanged since admission. ROS (14 point review of systems completed. Pertinent positives noted. Otherwise ROS is negative) :  GENERAL: No fever,chills, or night sweats. SKIN: No lesions or rashes. HEAD: No headaches or recent injury. EYES: No acute changes in vision, no diplopia or blurred vision. EARS: No hearing loss, no tinnitus. NOSE/THROAT: No rhinorrhea or pharyngitis, no nasal drainage. NECK: No lumps or unusual neck stiffness. PULMONARY: Respirations easy and non-labored, no acute distress.   CARDIAC: No chest pain, pressure, Negative for lower leg edema.  GI: Abdomen is soft and non-tender, non-distended. PERIPHERAL VASCULAR: No intermittent claudication or unusual leg cramps. MUSCULOSKELETAL: Occasional arthralgias, myalgias. NEUROLOGICAL: Denies any headache, near syncope, seizures or syncope. HEMATOLOGIC:  No unusual bruising or bleeding. PSYCH: Denies any homicidal or suicidial ideations. Medications:  Reviewed    Infusion Medications   Scheduled Medications    azithromycin  500 mg Intravenous Q24H    zinc sulfate  50 mg Oral Daily    vitamin C  1,000 mg Oral Daily    Vitamin D  1,000 Units Oral Daily    dexamethasone  6 mg Oral Daily    finasteride  1 mg Oral Daily    rosuvastatin  10 mg Oral Daily    sodium chloride flush  10 mL Intravenous 2 times per day    enoxaparin  30 mg Subcutaneous Q12H    remdesivir IVPB  100 mg Intravenous Q24H    sodium chloride  30 mL Intravenous Daily     PRN Meds: sodium chloride flush, acetaminophen **OR** acetaminophen, polyethylene glycol, promethazine **OR** ondansetron      Intake/Output Summary (Last 24 hours) at 8/29/2020 1604  Last data filed at 8/29/2020 1447  Gross per 24 hour   Intake 1471.37 ml   Output --   Net 1471.37 ml       Diet:  DIET GENERAL;    Exam:  /85   Pulse 80   Temp 97.9 °F (36.6 °C) (Oral)   Resp 20   Ht 5' 9\" (1.753 m)   Wt 189 lb 8 oz (86 kg)   SpO2 93%   BMI 27.98 kg/m²     General appearance: Alert and appropriate, pleasant adult male. No apparent distress, appears stated age and cooperative. HEENT: Pupils equal, round, and reactive to light. Conjunctivae/corneas clear. Neck: Supple, with full range of motion. No jugular venous distention. Trachea midline. Respiratory:  Normal respiratory effort. Clear to auscultation, bilaterally without Rales/Wheezes/Rhonchi. Cardiovascular: Regular rate and rhythm with normal S1/S2 without murmurs, rubs or gallops. Abdomen: Soft, non-tender, non-distended with normal bowel sounds.   Musculoskeletal: Passive and active ROM x 4 extremities. Skin: Skin color, texture, turgor normal.  No rashes or lesions. Neurologic:  Neurovascularly intact without any focal sensory/motor deficits. Cranial nerves: II-XII intact, grossly non-focal.  Psychiatric: Alert and oriented to person, place, time, and situation. Thought content appropriate, normal insight  Capillary Refill: Brisk,< 3 seconds   Peripheral Pulses: +2 palpable, equal bilaterally     Labs:   Recent Labs     08/27/20  2250   WBC 3.8*   HGB 13.6*   HCT 42.7        Recent Labs     08/27/20  2250   *   K 4.5   CL 96*   CO2 24   BUN 18   CREATININE 1.0   CALCIUM 8.4*     Recent Labs     08/27/20  2250 08/29/20  1216   AST 20 19   ALT 17 14   BILIDIR  --  <0.2   BILITOT 0.2* 0.3   ALKPHOS 53 52     No results for input(s): INR in the last 72 hours. No results for input(s): Igor Jones in the last 72 hours. Microbiology:    Blood culture #1:   Lab Results   Component Value Date    BC No growth-preliminary  08/27/2020       Blood culture #2:No results found for: Savanna Joseph    Organism:No results found for: ORG    No results found for: LABGRAM    MRSA culture only:No results found for: [address] [place] [address]    Urine culture: No results found for: LABURIN    Respiratory culture: No results found for: CULTRESP    Aerobic and Anaerobic :  No results found for: LABAERO  No results found for: LABANAE    Urinalysis:      Lab Results   Component Value Date    NITRU Negative 12/03/2018    BLOODU Negative 12/03/2018    GLUCOSEU Negative 12/03/2018       Radiology:  XR CHEST PORTABLE   Final Result   No gross infiltrate. PA and lateral radiographs could be performed if indicated clinically. **This report has been created using voice recognition software. It may contain minor errors which are inherent in voice recognition technology. **      Final report electronically signed by Dr. Ernestine Ghotra on 8/27/2020 10:21 PM        Xr Chest Portable    Result Date:

## 2020-08-30 VITALS
BODY MASS INDEX: 28.07 KG/M2 | HEIGHT: 69 IN | TEMPERATURE: 97.6 F | RESPIRATION RATE: 16 BRPM | OXYGEN SATURATION: 93 % | DIASTOLIC BLOOD PRESSURE: 76 MMHG | SYSTOLIC BLOOD PRESSURE: 126 MMHG | WEIGHT: 189.5 LBS | HEART RATE: 62 BPM

## 2020-08-30 LAB
ALBUMIN SERPL-MCNC: 3.8 G/DL (ref 3.5–5.1)
ALP BLD-CCNC: 53 U/L (ref 38–126)
ALT SERPL-CCNC: 15 U/L (ref 11–66)
ANION GAP SERPL CALCULATED.3IONS-SCNC: 9 MEQ/L (ref 8–16)
AST SERPL-CCNC: 19 U/L (ref 5–40)
BILIRUB SERPL-MCNC: 0.2 MG/DL (ref 0.3–1.2)
BILIRUBIN DIRECT: < 0.2 MG/DL (ref 0–0.3)
BUN BLDV-MCNC: 17 MG/DL (ref 7–22)
CALCIUM SERPL-MCNC: 9.1 MG/DL (ref 8.5–10.5)
CHLORIDE BLD-SCNC: 102 MEQ/L (ref 98–111)
CO2: 28 MEQ/L (ref 23–33)
CREAT SERPL-MCNC: 0.7 MG/DL (ref 0.4–1.2)
ERYTHROCYTE [DISTWIDTH] IN BLOOD BY AUTOMATED COUNT: 12.5 % (ref 11.5–14.5)
ERYTHROCYTE [DISTWIDTH] IN BLOOD BY AUTOMATED COUNT: 41.8 FL (ref 35–45)
GFR SERPL CREATININE-BSD FRML MDRD: > 90 ML/MIN/1.73M2
GLUCOSE BLD-MCNC: 135 MG/DL (ref 70–108)
HCT VFR BLD CALC: 43.9 % (ref 42–52)
HEMOGLOBIN: 14.3 GM/DL (ref 14–18)
MCH RBC QN AUTO: 29.5 PG (ref 26–33)
MCHC RBC AUTO-ENTMCNC: 32.6 GM/DL (ref 32.2–35.5)
MCV RBC AUTO: 90.7 FL (ref 80–94)
PLATELET # BLD: 208 THOU/MM3 (ref 130–400)
PMV BLD AUTO: 9.2 FL (ref 9.4–12.4)
POTASSIUM SERPL-SCNC: 4.2 MEQ/L (ref 3.5–5.2)
POTASSIUM SERPL-SCNC: 5.5 MEQ/L (ref 3.5–5.2)
RBC # BLD: 4.84 MILL/MM3 (ref 4.7–6.1)
SODIUM BLD-SCNC: 139 MEQ/L (ref 135–145)
TOTAL PROTEIN: 6.4 G/DL (ref 6.1–8)
WBC # BLD: 3 THOU/MM3 (ref 4.8–10.8)

## 2020-08-30 PROCEDURE — 2580000003 HC RX 258: Performed by: INTERNAL MEDICINE

## 2020-08-30 PROCEDURE — 80053 COMPREHEN METABOLIC PANEL: CPT

## 2020-08-30 PROCEDURE — 6360000002 HC RX W HCPCS: Performed by: PHYSICIAN ASSISTANT

## 2020-08-30 PROCEDURE — 36415 COLL VENOUS BLD VENIPUNCTURE: CPT

## 2020-08-30 PROCEDURE — 82248 BILIRUBIN DIRECT: CPT

## 2020-08-30 PROCEDURE — 2500000003 HC RX 250 WO HCPCS: Performed by: INTERNAL MEDICINE

## 2020-08-30 PROCEDURE — 2580000003 HC RX 258: Performed by: NURSE PRACTITIONER

## 2020-08-30 PROCEDURE — 99238 HOSP IP/OBS DSCHRG MGMT 30/<: CPT | Performed by: NURSE PRACTITIONER

## 2020-08-30 PROCEDURE — 6360000002 HC RX W HCPCS: Performed by: NURSE PRACTITIONER

## 2020-08-30 PROCEDURE — 2580000003 HC RX 258: Performed by: PHYSICIAN ASSISTANT

## 2020-08-30 PROCEDURE — 6370000000 HC RX 637 (ALT 250 FOR IP): Performed by: NURSE PRACTITIONER

## 2020-08-30 PROCEDURE — 85027 COMPLETE CBC AUTOMATED: CPT

## 2020-08-30 PROCEDURE — 84132 ASSAY OF SERUM POTASSIUM: CPT

## 2020-08-30 RX ORDER — CHOLECALCIFEROL (VITAMIN D3) 25 MCG
1000 TABLET ORAL DAILY
Qty: 30 TABLET | Refills: 0 | Status: SHIPPED | OUTPATIENT
Start: 2020-08-31 | End: 2020-09-30

## 2020-08-30 RX ORDER — DEXTROSE MONOHYDRATE 25 G/50ML
25 INJECTION, SOLUTION INTRAVENOUS ONCE
Status: COMPLETED | OUTPATIENT
Start: 2020-08-30 | End: 2020-08-30

## 2020-08-30 RX ORDER — ZINC SULFATE 50(220)MG
50 CAPSULE ORAL DAILY
Qty: 30 CAPSULE | Refills: 0 | COMMUNITY
Start: 2020-08-31

## 2020-08-30 RX ORDER — DEXAMETHASONE 6 MG/1
6 TABLET ORAL DAILY
Qty: 5 TABLET | Refills: 0 | Status: SHIPPED | OUTPATIENT
Start: 2020-08-31 | End: 2020-09-05

## 2020-08-30 RX ORDER — AZITHROMYCIN 500 MG/1
500 TABLET, FILM COATED ORAL DAILY
Qty: 3 TABLET | Refills: 0 | Status: SHIPPED | OUTPATIENT
Start: 2020-08-30 | End: 2020-09-02

## 2020-08-30 RX ADMIN — DEXTROSE MONOHYDRATE 25 G: 25 INJECTION, SOLUTION INTRAVENOUS at 08:55

## 2020-08-30 RX ADMIN — SODIUM CHLORIDE, PRESERVATIVE FREE 10 ML: 5 INJECTION INTRAVENOUS at 11:31

## 2020-08-30 RX ADMIN — DEXAMETHASONE 6 MG: 4 TABLET ORAL at 08:54

## 2020-08-30 RX ADMIN — ZINC SULFATE 220 MG (50 MG) CAPSULE 50 MG: CAPSULE at 08:54

## 2020-08-30 RX ADMIN — VITAMIN D 1000 UNITS: 25 TAB ORAL at 08:54

## 2020-08-30 RX ADMIN — OXYCODONE HYDROCHLORIDE AND ACETAMINOPHEN 1000 MG: 500 TABLET ORAL at 08:54

## 2020-08-30 RX ADMIN — AZITHROMYCIN 500 MG: 500 INJECTION, POWDER, LYOPHILIZED, FOR SOLUTION INTRAVENOUS at 16:46

## 2020-08-30 RX ADMIN — REMDESIVIR 100 MG: 100 INJECTION, POWDER, LYOPHILIZED, FOR SOLUTION INTRAVENOUS at 11:31

## 2020-08-30 RX ADMIN — SODIUM CHLORIDE 30 ML: 9 INJECTION, SOLUTION INTRAVENOUS at 11:31

## 2020-08-30 RX ADMIN — ENOXAPARIN SODIUM 30 MG: 30 INJECTION SUBCUTANEOUS at 08:54

## 2020-08-30 RX ADMIN — INSULIN HUMAN 10 UNITS: 100 INJECTION, SOLUTION PARENTERAL at 08:55

## 2020-08-30 ASSESSMENT — PAIN SCALES - GENERAL: PAINLEVEL_OUTOF10: 0

## 2020-08-30 NOTE — PLAN OF CARE
Problem: Airway Clearance - Ineffective  Goal: Achieve or maintain patent airway  8/30/2020 0021 by Blaise Bailey RN  Outcome: Ongoing  Note: Lung sounds clear throughout. Will continue to reassess. Problem: Gas Exchange - Impaired  Goal: Absence of hypoxia  8/30/2020 0021 by Blaise Bailey RN  Outcome: Ongoing  Note: Absence of hypoxia. Problem: Breathing Pattern - Ineffective  Goal: Ability to achieve and maintain a regular respiratory rate  8/30/2020 0021 by Blaise Bailey RN  Outcome: Ongoing  Note: Patient has regular respiratory rate. Monitoring continuously. Problem: Body Temperature -  Risk of, Imbalanced  Goal: Ability to maintain a body temperature within defined limits  Outcome: Ongoing  Note: Patient afebrile. Will continue to reassess. Problem: Isolation Precautions - Risk of Spread of Infection  Goal: Prevent transmission of infection  8/30/2020 0021 by Blaise Bailey RN  Outcome: Ongoing  Note: Isolation precautions being followed. Problem: Nutrition Deficits  Goal: Optimize nutrtional status  8/30/2020 0021 by Blaise Bailey RN  Outcome: Ongoing  Note: Patient on general diet. Tolerating well. Will continue to reassess. Problem: Discharge Planning:  Goal: Discharged to appropriate level of care  Description: Discharged to appropriate level of care  8/30/2020 0021 by Blaise Bailey RN  Outcome: Ongoing  Note: Patient plans to discharge home when medically stable. Problem: Falls - Risk of:  Goal: Will remain free from falls  Description: Will remain free from falls  8/30/2020 0021 by Blaise Bailey RN  Outcome: Ongoing  Note: Falling star placed outside of patient's room. 2/4 bed rails up. Non-skid socks provided. Call light and personal items with in reach. Bed alarm on.         Problem: Skin Integrity:  Goal: Will show no infection signs and symptoms  Description: Will show no infection signs and symptoms  Outcome: Ongoing  Note: No new skin breakdown noted at this time. Will continue to reassess. Patient turns and repositions self in bed frequent       Problem: Pain:  Goal: Control of acute pain  Description: Control of acute pain  Outcome: Ongoing  Note: No new skin breakdown noted at this time. Will continue to reassess. Patient turns and repositions self in bed frequent       Problem: Risk for Fluid Volume Deficit  Goal: Maintain normal serum potassium, sodium, calcium, phosphorus, and pH  Note: Morning labs to be drawn. Will continue to reassess. Care plan reviewed with patient. No concerns voiced.

## 2020-08-30 NOTE — PLAN OF CARE
Problem: Airway Clearance - Ineffective  Goal: Achieve or maintain patent airway  8/30/2020 1150 by Kajal Jarquin RN  Outcome: Ongoing  Note: Pt able to keep airway patent     Problem: Gas Exchange - Impaired  Goal: Absence of hypoxia  8/30/2020 1150 by Kajal Jarquin RN  Outcome: Ongoing  Note: Pt remains on room air, continuous pulse ox in place     Problem: Breathing Pattern - Ineffective  Goal: Ability to achieve and maintain a regular respiratory rate  8/30/2020 1150 by Kajal Jarquin RN  Outcome: Ongoing  Note: Respirations easy and unlabored, 16-20 per minute     Problem: Body Temperature -  Risk of, Imbalanced  Goal: Ability to maintain a body temperature within defined limits  8/30/2020 1150 by Kajal Jarquin RN  Outcome: Ongoing  Note: Afebrile this shift     Problem: Isolation Precautions - Risk of Spread of Infection  Goal: Prevent transmission of infection  8/30/2020 1150 by Kajal Jarquin RN  Outcome: Ongoing  Note: Remains in droplet plus isolation     Problem: Nutrition Deficits  Goal: Optimize nutrtional status  8/30/2020 1150 by Kajal Jarquin RN  Outcome: Ongoing  Note: Tolerating diet     Problem: Discharge Planning:  Goal: Discharged to appropriate level of care  Description: Discharged to appropriate level of care  8/30/2020 1150 by Kajal Jarquin RN  Outcome: Ongoing  Note: Plans on returning home with wife at discharge     Problem: Falls - Risk of:  Goal: Will remain free from falls  Description: Will remain free from falls  8/30/2020 1150 by Kajal Jarquin RN  Outcome: Ongoing  Note: No falls this shift, up with standby assist     Problem: Skin Integrity:  Goal: Absence of new skin breakdown  Description: Absence of new skin breakdown  Outcome: Ongoing  Note: Pt turns and repositions self frequently     Problem: Pain:  Goal: Pain level will decrease  Description: Pain level will decrease  Outcome: Ongoing  Note: Denies any pain this shift     Care plan reviewed with patient. Patient verbalize understanding of the plan of care and contribute to goal setting.

## 2020-08-30 NOTE — DISCHARGE SUMMARY
Hospitalist Discharge Summary        Patient: Percy Veras  YOB: 1960  MRN: [id number]   Acct: [de-identified]    Primary Care Physician: Jerson Maxwell DO    Admit date  8/27/2020    Discharge date:  8/30/2020 3:18 PM    Chief Complaint on presentation :-  Fever    Discharge Assessment and Plan:-     1. COVID-19: Identified on lab testing. Patient received Remdesivir, azithromycin, Decadron, ascorbic acid, vitamin D, and zinc.  Incentive spirometry, Acapella, and pulmonary toileting were also performed during his stay. 2. SIRS: Criteria met with fever and leukopenia. Patient responded well to therapy as described above. Patient remained afebrile without physical complaints. 3. Hyperkalemia: Resolved. Potassium level 4.2. Patient was treated with insulin and D50 with therapeutic response. 4. Hyperlipidemia: Continue rosuvastatin at discharge. Initial H and P and Hospital course:-  **From Chart Review:  \"Patient presents to the ED with fever for 6 days.  The patient has had a COVID 19 exposure (wife) and presumed he was positive.  The patient has been feeling progressively worse.  The patient felt like he was having a hard time catching his breath after any activity.  In the ED the patient tolerated room air but his saturations were less than 90% with ambulation. \"    During the patient's stay, he was treated for COVID-19 with the therapy described above. He remained afebrile and responded well to the treatments. At the time of discharge, the patient appears well and was alert and oriented to person, place, time, and situation. He denied any chest pain, shortness of breath, nausea, vomiting, abdominal pain, diarrhea, constipation, urinary complaints, lightheadedness, dizziness, headaches, changes in vision, fever, or chills.   He was updated on the discharge plan of care, verbalizes understanding, instructed to follow-up with his PCP, and had no other needs or complaints at this 31, 2020     azithromycin 500 MG tablet  Commonly known as:  ZITHROMAX  Take 1 tablet by mouth daily for 3 days     dexamethasone 6 MG tablet  Commonly known as:  DECADRON  Take 1 tablet by mouth daily for 5 days  Start taking on:  August 31, 2020     Vitamin D3 25 MCG Tabs  Take 1 tablet by mouth daily  Start taking on:  August 31, 2020     zinc sulfate 220 (50 Zn) MG capsule  Commonly known as:  ZINCATE  Take 1 capsule by mouth daily  Start taking on:  August 31, 2020        CONTINUE taking these medications    Crestor 10 MG tablet  Generic drug:  rosuvastatin     Propecia 1 MG tablet  Generic drug:  finasteride           Where to Get Your Medications      These medications were sent to [facility]/pharm[facility] #[id number]- [facility], [address] - F [phone]  [address] [place] [address]    Phone:  [phone]   · ascorbic acid 1000 MG tablet  · azithromycin 500 MG tablet  · dexamethasone 6 MG tablet  · Vitamin D3 25 MCG Tabs     You can get these medications from any pharmacy    You don't need a prescription for these medications  · zinc sulfate 220 (50 Zn) MG capsule          Labs :-  Recent Results (from the past 72 hour(s))   Covid-19 Ambulatory    Collection Time: 08/27/20 10:15 PM   Result Value Ref Range    SARS-CoV-2 Detected (A) Not Detected   CBC Auto Differential    Collection Time: 08/27/20 10:50 PM   Result Value Ref Range    WBC 3.8 (L) 4.8 - 10.8 thou/mm3    RBC 4.65 (L) 4.70 - 6.10 mill/mm3    Hemoglobin 13.6 (L) 14.0 - 18.0 gm/dl    Hematocrit 42.7 42.0 - 52.0 %    MCV 91.8 80.0 - 94.0 fL    MCH 29.2 26.0 - 33.0 pg    MCHC 31.9 (L) 32.2 - 35.5 gm/dl    RDW-CV 12.4 11.5 - 14.5 %    RDW-SD 41.5 35.0 - 45.0 fL    Platelets 550 290 - 148 thou/mm3    MPV 9.2 (L) 9.4 - 12.4 fL    Seg Neutrophils 58.0 %    Lymphocytes 25.2 %    Monocytes 15.4 %    Eosinophils 0.8 %    Basophils 0.3 %    Immature Granulocytes 0.3 %    Atypical Lymphocytes OCC. %    Platelet Estimate ADEQUATE Adequate    Segs Anion Gap 9.0 8.0 - 16.0 meq/L   Glomerular Filtration Rate, Estimated    Collection Time: 08/30/20  5:40 AM   Result Value Ref Range    Est, Glom Filt Rate >90 ml/min/1.73m2   Potassium    Collection Time: 08/30/20 10:46 AM   Result Value Ref Range    Potassium 4.2 3.5 - 5.2 meq/L        Microbiology:    Blood culture #1:   Lab Results   Component Value Date    BC No growth-preliminary  08/27/2020       Blood culture #2:No results found for: Vinie Snellen    Organism:  No results found for: LABGRAM    MRSA culture only:No results found for: [facility]    Urine culture: No results found for: LABURIN  No results found for: ORG     Respiratory culture: No results found for: CULTRESP    Aerobic and Anaerobic :  No results found for: LABAERO  No results found for: LABANAE    Urinalysis:      Lab Results   Component Value Date    NITRU Negative 12/03/2018    BLOODU Negative 12/03/2018    GLUCOSEU Negative 12/03/2018       Radiology:-  Xr Chest Portable    Result Date: 8/27/2020  PROCEDURE: XR CHEST PORTABLE CLINICAL INFORMATION: COVID. COMPARISON: No prior study. TECHNIQUE: AP upright view of the chest. FINDINGS: Heart size is likely normal for technique. Bilateral nipple shadows are suspected. There is no gross infiltrate. No gross infiltrate. PA and lateral radiographs could be performed if indicated clinically. **This report has been created using voice recognition software. It may contain minor errors which are inherent in voice recognition technology. ** Final report electronically signed by Dr. Kenneth Ortiz on 8/27/2020 10:21 PM       Follow-up scheduled after discharge :-    in 1-2 weeks with Saurav Nugent DO    Consultations during this hospital stay:-  [x] NONE [] Cardiology  [] Nephrology  [] Hemo onco   [] GI   [] ID  [] Endocrine  [] Pulm    [] Neuro    [] Psych   [] Urology  [] ENT   [] G SURGERY   []Ortho    []CV surg    [] Palliative  [] Hospice [] Pain management   []    []TCU   [] PT/OT  OTHERS:-    Disposition: home  Condition at Discharge: Stable    Time Spent:- 10 minutes    Electronically signed by KHOA Mcmullen CNP on 8/30/2020 at 3:18 PM  Discharging Hospitalist

## 2020-08-31 ENCOUNTER — CARE COORDINATION (OUTPATIENT)
Dept: CASE MANAGEMENT | Age: 60
End: 2020-08-31

## 2020-08-31 NOTE — CARE COORDINATION
Patient contacted regarding GLYDB-83 diagnosis\". Discussed COVID-19 related testing which was available at this time. Test results were positive. Patient informed of results, if available? Yes    Care Transition Nurse/ Ambulatory Care Manager contacted the patient by telephone to perform post discharge assessment. Verified name and  with patient as identifiers. Provided introduction to self, and explanation of the CTN/ACM role, and reason for call due to risk factors for infection and/or exposure to COVID-19. Symptoms reviewed with patient who verbalized the following symptoms: no worsening symptoms or new symptoms. Due to no new or worsening symptoms encounter was not routed to provider for escalation. Discussed follow-up appointments. If no appointment was previously scheduled, appointment scheduling offered: Yes  [facility] follow up appointment(s): No future appointments. Advance Care Planning:   Does patient have an Advance Directive:  reviewed and current. Patient has following risk factors of: no known risk factors. CTN/ACM reviewed discharge instructions, medical action plan and red flags such as increased shortness of breath, increasing fever and signs of decompensation with patient who verbalized understanding. Discussed exposure protocols and quarantine with CDC Guidelines What to do if you are sick with coronavirus disease 2019.  Patient was given an opportunity for questions and concerns. The patient agrees to contact the [facility] exposure line [phone], local [facility] department [place] Department of Health: ([phone]) and PCP office for questions related to their healthcare. CTN/ACM provided contact information for future needs.     Reviewed and educated patient on any new and changed medications related to discharge diagnosis     Patient/family/caregiver given information for [facility] [facility] and agrees to enroll no      Plan for follow-up call in 5-7 days based on severity of symptoms and risk factors. Patient and wife has no concerns or issues. He is doing his breathing exercises. Patient is just feeling weak and tired.

## 2020-08-31 NOTE — CARE COORDINATION
8/31/20, 7:55 AM EDT    Late entry  Discharged home with wife yesterday. Denies needs. Declines HH. Patient goals/plan/ treatment preferences discussed by  and . Patient goals/plan/ treatment preferences reviewed with patient/ family. Patient/ family verbalize understanding of discharge plan and are in agreement with goal/plan/treatment preferences. Understanding was demonstrated using the teach back method. AVS provided by RN at time of discharge, which includes all necessary medical information pertaining to the patients current course of illness, treatment, post-discharge goals of care, and treatment preferences. [address] [id number] Department to notify COVID + patient discharged home.

## 2020-09-02 LAB
BLOOD CULTURE, ROUTINE: NORMAL
BLOOD CULTURE, ROUTINE: NORMAL

## 2020-09-03 ENCOUNTER — CARE COORDINATION (OUTPATIENT)
Dept: CASE MANAGEMENT | Age: 60
End: 2020-09-03

## 2020-09-03 NOTE — CARE COORDINATION
You Patient resolved from the Care Transitions episode on 8/31/20  Discussed COVID-19 related testing which was available at this time. Test results were positive. Patient informed of results, if available? Yes    Patient/family has been provided the following resources and education related to COVID-19:                         Signs, symptoms and red flags related to COVID-19            CDC exposure and quarantine guidelines            [facility] exposure contact - [phone]            Contact for their local Department of Health                 Patient currently reports that he has no new or worse symptoms. No further outreach scheduled with this CTN/ACM. Episode of Care resolved. Patient has this CTN/ACM contact information if future needs arise.